# Patient Record
Sex: MALE | Race: WHITE | NOT HISPANIC OR LATINO | URBAN - METROPOLITAN AREA
[De-identification: names, ages, dates, MRNs, and addresses within clinical notes are randomized per-mention and may not be internally consistent; named-entity substitution may affect disease eponyms.]

---

## 2020-01-04 ENCOUNTER — EMERGENCY (EMERGENCY)
Facility: HOSPITAL | Age: 25
LOS: 1 days | Discharge: LEFT BEFORE DISCHARGE | End: 2020-01-04
Admitting: EMERGENCY MEDICINE

## 2020-01-04 VITALS
RESPIRATION RATE: 16 BRPM | HEIGHT: 65 IN | DIASTOLIC BLOOD PRESSURE: 78 MMHG | WEIGHT: 154.98 LBS | SYSTOLIC BLOOD PRESSURE: 122 MMHG | OXYGEN SATURATION: 99 % | HEART RATE: 59 BPM | TEMPERATURE: 98 F

## 2020-01-04 NOTE — ED PROVIDER NOTE - PROGRESS NOTE DETAILS
pt not in [place] when md came to evaluate patient, per nurses had gone outside, pt returned by when md returned for evaluation pt left without being seen

## 2021-05-26 ENCOUNTER — EMERGENCY (EMERGENCY)
Facility: HOSPITAL | Age: 26
LOS: 1 days | Discharge: HOME | End: 2021-05-26
Attending: EMERGENCY MEDICINE | Admitting: EMERGENCY MEDICINE
Payer: SELF-PAY

## 2021-05-26 VITALS
DIASTOLIC BLOOD PRESSURE: 64 MMHG | RESPIRATION RATE: 16 BRPM | SYSTOLIC BLOOD PRESSURE: 104 MMHG | OXYGEN SATURATION: 98 % | TEMPERATURE: 98 F | HEART RATE: 72 BPM

## 2021-05-26 VITALS
WEIGHT: 160.06 LBS | TEMPERATURE: 99 F | HEART RATE: 78 BPM | HEIGHT: 65 IN | DIASTOLIC BLOOD PRESSURE: 66 MMHG | OXYGEN SATURATION: 97 % | SYSTOLIC BLOOD PRESSURE: 103 MMHG | RESPIRATION RATE: 15 BRPM

## 2021-05-26 PROCEDURE — 73610 X-RAY EXAM OF ANKLE: CPT

## 2021-05-26 PROCEDURE — 99283 EMERGENCY DEPT VISIT LOW MDM: CPT | Mod: 25

## 2021-05-26 PROCEDURE — 99283 EMERGENCY DEPT VISIT LOW MDM: CPT

## 2021-05-26 PROCEDURE — 73610 X-RAY EXAM OF ANKLE: CPT | Mod: 26,LT

## 2021-05-26 NOTE — ED PROVIDER NOTE - CLINICAL SUMMARY MEDICAL DECISION MAKING FREE TEXT BOX
left ankle injury, twisted at work, fall , patient denies any other injuries, refused pain medication, will obtain xray, r/o fx , follow up with orthopedic

## 2021-05-26 NOTE — ED PROVIDER NOTE - NSFOLLOWUPINSTRUCTIONS_ED_ALL_ED_FT
follow up with ortho Dr Johnson, call tomorrow to arrange follow up  any worsening symptoms return to ED  recommend maintain use of splint and nonweight bearing with use of crutches  till you see orthopedic

## 2021-05-26 NOTE — ED PROVIDER NOTE - PATIENT PORTAL LINK FT
You can access the FollowMyHealth Patient Portal offered by [facility] by registering at the following website: [email or web address]. By joining [employer]’s FollowMyHealth portal, you will also be able to view your health information using other applications (apps) compatible with our system.

## 2021-05-26 NOTE — ED PROVIDER NOTE - OBJECTIVE STATEMENT
25 y male presents with left ankle injury, states twisted left ankle and fell today at work, denies any other injuries, no head injury , no loc.  refused pain medication.  denies hx of previous injury to left ankle.  + smoker, no PMD, no ortho

## 2021-05-26 NOTE — ED PROVIDER NOTE - CARE PROVIDER_API CALL
Gavino Johnson  ORTHOPAEDIC SURGERY  [address] [place] [address]  Phone: [phone]  Fax: [phone]  Follow Up Time: 1-3 Days

## 2021-05-26 NOTE — ED PROVIDER NOTE - ATTENDING CONTRIBUTION TO CARE
24 y/o M with c/o left ankle pain after trip and fall today.  denies other trauma    PE: well appearing no distress  left ankle swelling lateral mall.  +2 pulses    XRay, pain control

## 2021-05-26 NOTE — ED PROVIDER NOTE - PROGRESS NOTE DETAILS
xray result discussed, no acute finding, splint applied, advised follow up with orthopedic Dr Johnson, call tomorrow to arrange follow up , any worsening symptoms  or concerns return to ED xray result discussed, no acute finding, cannot exclude occult fx, splint applied, advised follow up with orthopedic Dr Johnson, call tomorrow to arrange follow up , any worsening symptoms  or concerns return to ED, nonweight bearing with crutches till seen by orthopedic

## 2021-05-27 ENCOUNTER — NON-APPOINTMENT (OUTPATIENT)
Age: 26
End: 2021-05-27

## 2021-05-27 ENCOUNTER — APPOINTMENT (OUTPATIENT)
Dept: ORTHOPEDIC SURGERY | Facility: CLINIC | Age: 26
End: 2021-05-27
Payer: OTHER MISCELLANEOUS

## 2021-05-27 VITALS
HEIGHT: 65 IN | HEART RATE: 79 BPM | WEIGHT: 160 LBS | SYSTOLIC BLOOD PRESSURE: 104 MMHG | BODY MASS INDEX: 26.66 KG/M2 | DIASTOLIC BLOOD PRESSURE: 68 MMHG

## 2021-05-27 DIAGNOSIS — M25.572 PAIN IN LEFT ANKLE AND JOINTS OF LEFT FOOT: ICD-10-CM

## 2021-05-27 PROCEDURE — 99203 OFFICE O/P NEW LOW 30 MIN: CPT

## 2021-05-27 PROCEDURE — 73610 X-RAY EXAM OF ANKLE: CPT | Mod: LT

## 2021-05-27 PROCEDURE — 99072 ADDL SUPL MATRL&STAF TM PHE: CPT

## 2021-05-27 RX ORDER — MELOXICAM 15 MG/1
15 TABLET ORAL DAILY
Qty: 30 | Refills: 0 | Status: ACTIVE | COMMUNITY
Start: 2021-05-27 | End: 1900-01-01

## 2021-05-31 PROBLEM — M25.572 ACUTE LEFT ANKLE PAIN: Status: ACTIVE | Noted: 2021-05-27

## 2021-05-31 NOTE — PHYSICAL EXAM
[de-identified] : Extremity: +PPAV (supple) B feet, able to perform B SLHR testing, soft tissue swelling lateral L ankle, residual tenderness anterolateral aspect L ankle / ATFL.  Nontender L distal fibula, peroneals, syndesmosis, Achilles, medial malleolus, Deltoid, hindfoot ST, midfoot LF and PTT insertional, and forefoot / base 5th metatarsal.  Stable Drawer testing L ankle, 5- / 5 evertor strength L ankle, calves soft and nontender, sensorimotor unchanged, skin intact B LE.  AOx3, mood / affect normal. [de-identified] : EXAM: XR ANKLE COMP MIN 3 VIEWS LT\par \par PROCEDURE DATE: 05/26/2021\par \par INTERPRETATION: DATE OF STUDY: 5/26/21\par \par COMPARISON: None.\par \par CLINICAL HISTORY: Status post left ankle injury\par \par FINDINGS:\par 3 views of the left ankle are submitted. The osseous and articular structures are intact without evidence of fracture, dislocation, arthritis or joint effusion.\par The ankle mortise and talar dome are maintained.\par \par IMPRESSION: No fracture-subluxation demonstrated.\par \par MELECIO MELGAR MD; Attending Radiologist\par This document has been electronically signed. May 26 2021 3:52PM\par Radiographs (3v L ankle) reveal soft tissue swelling lateral L ankle.

## 2021-05-31 NOTE — DISCUSSION/SUMMARY
[de-identified] : Discussed with patient nature of condition, potential course / sequelae, options reviewed.  Cam boot immobilization at all times and Cam boot ambulation, activity modification, physical therapy / rehabilitation and associated modalities, HEP.  Return to office in 4 - 6 weeks / PRN, all questions answered.

## 2021-05-31 NOTE — HISTORY OF PRESENT ILLNESS
[FreeTextEntry1] : Mr. ESTEFANIA HERNANDEZ  is a 25 year old male who presents with left ankle pain since yesterday when he slipped and twisted his ankle on a step while at work.  He went to [facility] and was placed in an ankle splint.  He has lateral ankle pain and has not placed any weight on that foot. \par \par

## 2021-06-10 ENCOUNTER — APPOINTMENT (OUTPATIENT)
Dept: ORTHOPEDIC SURGERY | Facility: CLINIC | Age: 26
End: 2021-06-10

## 2021-06-16 ENCOUNTER — APPOINTMENT (OUTPATIENT)
Dept: ORTHOPEDIC SURGERY | Facility: CLINIC | Age: 26
End: 2021-06-16
Payer: OTHER MISCELLANEOUS

## 2021-06-16 DIAGNOSIS — S96.912D STRAIN OF UNSPECIFIED MUSCLE AND TENDON AT ANKLE AND FOOT LEVEL, LEFT FOOT, SUBSEQUENT ENCOUNTER: ICD-10-CM

## 2021-06-16 DIAGNOSIS — M21.42 FLAT FOOT [PES PLANUS] (ACQUIRED), RIGHT FOOT: ICD-10-CM

## 2021-06-16 DIAGNOSIS — M21.41 FLAT FOOT [PES PLANUS] (ACQUIRED), RIGHT FOOT: ICD-10-CM

## 2021-06-16 PROCEDURE — 99213 OFFICE O/P EST LOW 20 MIN: CPT

## 2021-06-16 PROCEDURE — 99072 ADDL SUPL MATRL&STAF TM PHE: CPT

## 2021-06-17 PROBLEM — M21.41 ACQUIRED PES PLANUS OF BOTH FEET: Status: ACTIVE | Noted: 2021-05-31

## 2021-06-17 PROBLEM — S96.912D STRAIN OF LEFT ANKLE, SUBSEQUENT ENCOUNTER: Status: ACTIVE | Noted: 2021-05-31

## 2021-06-17 NOTE — PHYSICAL EXAM
[Normal] : Oriented to person, place, and time, insight and judgement were intact and the affect was normal [de-identified] : Extremity: +PPAV (supple) B feet, able to perform B SLHR testing, resolving soft tissue swelling lateral L ankle, nontender anterolateral aspect L ankle / ATFL. Nontender L distal fibula, peroneals, syndesmosis, Achilles, medial malleolus, Deltoid, hindfoot ST, midfoot LF and PTT insertional, and forefoot / base 5th metatarsal. Stable Drawer testing L ankle, 5 / 5 evertor strength L ankle, good range of motion / painless L ankle and ST joint left hindfoot, calves soft and nontender, sensorimotor unchanged, skin intact B LE. AOx3, mood / affect normal. \par  [de-identified] : NATALEE, NAD

## 2021-06-17 NOTE — DISCUSSION/SUMMARY
[de-identified] : Options reviewed, NB shoe wear, activity modification and progression protocol, ankle rehabilitation and HEP.  Return to office in 2 - 3 months / PRN, all questions answered.

## 2021-06-17 NOTE — HISTORY OF PRESENT ILLNESS
[FreeTextEntry1] : Patient is here for left ankle pain follow up. He has noticed great improvement in his condition since last evaluation. He has done nothing to treat it. He states that he feels ready to return to work at this time. There has been no recent injury.

## 2021-06-17 NOTE — RETURN TO WORK/SCHOOL
[FreeTextEntry1] :  He is cleared to return to full work duties without restrictions as of 6/17/21. Should you have any questions, please contact the office at [phone].

## 2021-07-14 NOTE — ED PROVIDER NOTE - FAMILY HISTORY
Referred by: Cathy Mathew PA-C; Medical Diagnosis (from order):    Diagnosis Information      Diagnosis    719.41, 338.29 (ICD-9-CM) - M25.511, G89.29, M25.512 (ICD-10-CM) - Chronic pain of both shoulders    719.42 (ICD-9-CM) - M25.521 (ICD-10-CM) - Right elbow pain                Progress Note    Visit:  6     SUBJECTIVE                                                                                                             \"I think therapy is working but now I have this pain on the side of my hand after I fell.\"  Pain / Symptoms:  Pain rating (out of 10): Current: 2 ; Worst: 3Location: Right ulnar sided palm    1/10 right lateral epicondyle     OBJECTIVE                                                                                                                     Range of Motion (ROM)   (degrees unless noted; active unless noted; norms in ( ); negative=lacking to 0, positive=beyond 0)   Wrist:    - Flexion (60-80):        • Right:  70    - Extension (60-70):        • Right: 65    - Radial Deviation (20):        • Right: 20    - Ulnar Deviation (30):        • Right: 30 pain    Strength  (out of 5 unless noted, standard test position unless noted, lbs tested with hand held dynamometer)   : (lbs)    - Neutral:        • Right: Trial(s): 79.3, 63.3, 72.7, Average: 71.77    - Extended Pronation:         • Right: Trial(s): 63.9, 70.3, 64.97, Average: 60.7     norms: 55-59 years, male: left 59.8-106.6, right 74.4-127.8; female: left 35.4-59.2, right 44.8-69.8        Outcome Measures:   Quick Disabilities of the Arm, Shoulder and Hand: QuickDash Total Score: 20.45  (scored 0-100; a higher score indicates greater disability) see flowsheet for additional documentation    TREATMENT                                                                                                                  Therapeutic Exercise:  See HEP    Charly twist with red flexbar x15 reps   Thenar stretch with yellow theraputty  x15 reps    Manual Therapy:  Soft tissue mobilization to patient's wrist flexors, extensors and carpal tunnel, Hawkgrips used.  P/ROM to wrist flexors and extensors x15 reps.      Home Exercise Program: Passive wrist flexor stretch 5 x daily - 7 x weekly - 1 sets - 10 reps  Passive wrist extensor stretch 5 x daily - 7 x weekly - 1 sets - 10 reps  Median nerve glides on wall 5 x daily - 7 x weekly - 1 sets - 10 reps  Wrist extensor massage with tennis ball 5 x daily - 7 x weekly - 1 sets - 10 reps    Ice massage 3-5 minutes and/or ice pack 10 minutes 1-2x/day    Issued via [employer]:  [email or web address]    Ultrasound (22245)  Location: left lateral epicondyle and ulnar sided wrist  Duty Cycle: 100%  Frequency: 1 Mhz  Intensity (w/cm2): 1.4  Duration: 5 minutes each    ASSESSMENT                                                                                                             Patient has met 1 of 3 goals set at his initial evaluation. He presents to therapy with report of minimal pain at the lateral epicondyle, however pain is now reported at the ulnar sided wrist after a fall. Encouraged patient to follow up with MD regarding pain. He did report relief with KT, however states that it does not last throughout the day for him. OT discussed possible benefits of wrist widget. Numbness and tingling at the median nerve distribution intermittently occurs at night. No pain had been expressed regarding bilateral shoulder. He would benefit from continued occupational therapy to progress toward remaining goals and educate on proper orthotic management for ulnar sided wrist pain.    To date the patient has made gains as expected as reported. Patient continues to have impairments and functional deficits as noted.  Patient will continue to benefit from skilled care as outlined.  Patient Education:   Results  of above outlined education: Verbalizes understanding      PLAN                                                                                                                           Updates to plan of care: extend current plan of care    Frequency / Duration: 1 times per week tapering as patient progresses  for an estimated additional 4 visits for additional 6 weeks    Suggestions for next session as indicated: Continue per POC for 4 additional OT sessions.       GOALS                                                                                                                           Long Term Goals: to be met by end of plan of care  1. Patient will increase right hand  strength with elbow extended and forearm pronated to >80 lb. to better reach to open a heavy door.  Status: progressing/ongoing  2. Patient will increase right wrist flexion to 70 degrees to better wash his upper back.  Status: met   3. Patient will report <2/10 pain with activities.  Status: progressing/ongoing      Therapy procedure time and total treatment time can be found documented on the Time Entry flowsheet   No pertinent family history in first degree relatives

## 2023-01-28 ENCOUNTER — NON-APPOINTMENT (OUTPATIENT)
Age: 28
End: 2023-01-28

## 2024-07-02 ENCOUNTER — EMERGENCY (EMERGENCY)
Facility: HOSPITAL | Age: 29
LOS: 1 days | Discharge: HOME | End: 2024-07-02
Attending: STUDENT IN AN ORGANIZED HEALTH CARE EDUCATION/TRAINING PROGRAM | Admitting: STUDENT IN AN ORGANIZED HEALTH CARE EDUCATION/TRAINING PROGRAM
Payer: COMMERCIAL

## 2024-07-02 VITALS
WEIGHT: 190.7 LBS | SYSTOLIC BLOOD PRESSURE: 136 MMHG | HEIGHT: 65 IN | RESPIRATION RATE: 18 BRPM | TEMPERATURE: 98 F | HEART RATE: 55 BPM | DIASTOLIC BLOOD PRESSURE: 72 MMHG | OXYGEN SATURATION: 98 %

## 2024-07-02 VITALS
SYSTOLIC BLOOD PRESSURE: 130 MMHG | OXYGEN SATURATION: 100 % | DIASTOLIC BLOOD PRESSURE: 76 MMHG | HEART RATE: 60 BPM | RESPIRATION RATE: 18 BRPM | TEMPERATURE: 98 F

## 2024-07-02 LAB
ALBUMIN SERPL ELPH-MCNC: 4.2 G/DL — SIGNIFICANT CHANGE UP (ref 3.3–5)
ALP SERPL-CCNC: 52 U/L — SIGNIFICANT CHANGE UP (ref 40–120)
ALT FLD-CCNC: 38 U/L — SIGNIFICANT CHANGE UP (ref 12–78)
ANION GAP SERPL CALC-SCNC: 5 MMOL/L — SIGNIFICANT CHANGE UP (ref 5–17)
APPEARANCE UR: ABNORMAL
AST SERPL-CCNC: 25 U/L — SIGNIFICANT CHANGE UP (ref 15–37)
BILIRUB SERPL-MCNC: 0.9 MG/DL — SIGNIFICANT CHANGE UP (ref 0.2–1.2)
BILIRUB UR-MCNC: ABNORMAL
BUN SERPL-MCNC: 11 MG/DL — SIGNIFICANT CHANGE UP (ref 7–23)
CALCIUM SERPL-MCNC: 9.6 MG/DL — SIGNIFICANT CHANGE UP (ref 8.5–10.1)
CHLORIDE SERPL-SCNC: 107 MMOL/L — SIGNIFICANT CHANGE UP (ref 96–108)
CO2 SERPL-SCNC: 25 MMOL/L — SIGNIFICANT CHANGE UP (ref 22–31)
COLOR SPEC: ABNORMAL
CREAT SERPL-MCNC: 1.3 MG/DL — SIGNIFICANT CHANGE UP (ref 0.5–1.3)
DIFF PNL FLD: ABNORMAL
EGFR: 76 ML/MIN/1.73M2 — SIGNIFICANT CHANGE UP
GLUCOSE SERPL-MCNC: 116 MG/DL — HIGH (ref 70–99)
GLUCOSE UR QL: NEGATIVE MG/DL — SIGNIFICANT CHANGE UP
HCT VFR BLD CALC: 43.3 % — SIGNIFICANT CHANGE UP (ref 39–50)
HGB BLD-MCNC: 14.8 G/DL — SIGNIFICANT CHANGE UP (ref 13–17)
KETONES UR-MCNC: ABNORMAL MG/DL
LEUKOCYTE ESTERASE UR-ACNC: ABNORMAL
MCHC RBC-ENTMCNC: 29.5 PG — SIGNIFICANT CHANGE UP (ref 27–34)
MCHC RBC-ENTMCNC: 34.2 GM/DL — SIGNIFICANT CHANGE UP (ref 32–36)
MCV RBC AUTO: 86.4 FL — SIGNIFICANT CHANGE UP (ref 80–100)
NITRITE UR-MCNC: NEGATIVE — SIGNIFICANT CHANGE UP
NRBC # BLD: 0 /100 WBCS — SIGNIFICANT CHANGE UP (ref 0–0)
PH UR: 5.5 — SIGNIFICANT CHANGE UP (ref 5–8)
PLATELET # BLD AUTO: 289 K/UL — SIGNIFICANT CHANGE UP (ref 150–400)
POTASSIUM SERPL-MCNC: 4.3 MMOL/L — SIGNIFICANT CHANGE UP (ref 3.5–5.3)
POTASSIUM SERPL-SCNC: 4.3 MMOL/L — SIGNIFICANT CHANGE UP (ref 3.5–5.3)
PROT SERPL-MCNC: 7.7 G/DL — SIGNIFICANT CHANGE UP (ref 6–8.3)
PROT UR-MCNC: 300 MG/DL
RBC # BLD: 5.01 M/UL — SIGNIFICANT CHANGE UP (ref 4.2–5.8)
RBC # FLD: 12.5 % — SIGNIFICANT CHANGE UP (ref 10.3–14.5)
SODIUM SERPL-SCNC: 137 MMOL/L — SIGNIFICANT CHANGE UP (ref 135–145)
SP GR SPEC: 1.03 — HIGH (ref 1–1.03)
UROBILINOGEN FLD QL: 1 MG/DL — SIGNIFICANT CHANGE UP (ref 0.2–1)
WBC # BLD: 9 K/UL — SIGNIFICANT CHANGE UP (ref 3.8–10.5)
WBC # FLD AUTO: 9 K/UL — SIGNIFICANT CHANGE UP (ref 3.8–10.5)

## 2024-07-02 PROCEDURE — 96374 THER/PROPH/DIAG INJ IV PUSH: CPT

## 2024-07-02 PROCEDURE — 85027 COMPLETE CBC AUTOMATED: CPT

## 2024-07-02 PROCEDURE — 80053 COMPREHEN METABOLIC PANEL: CPT

## 2024-07-02 PROCEDURE — 96375 TX/PRO/DX INJ NEW DRUG ADDON: CPT

## 2024-07-02 PROCEDURE — 74176 CT ABD & PELVIS W/O CONTRAST: CPT | Mod: MC

## 2024-07-02 PROCEDURE — 74176 CT ABD & PELVIS W/O CONTRAST: CPT | Mod: 26,MC

## 2024-07-02 PROCEDURE — 99284 EMERGENCY DEPT VISIT MOD MDM: CPT | Mod: 25

## 2024-07-02 PROCEDURE — 36415 COLL VENOUS BLD VENIPUNCTURE: CPT

## 2024-07-02 PROCEDURE — 99285 EMERGENCY DEPT VISIT HI MDM: CPT

## 2024-07-02 PROCEDURE — 81001 URINALYSIS AUTO W/SCOPE: CPT

## 2024-07-02 PROCEDURE — 87086 URINE CULTURE/COLONY COUNT: CPT

## 2024-07-02 RX ORDER — KETOROLAC TROMETHAMINE 30 MG/ML
15 INJECTION, SOLUTION INTRAMUSCULAR ONCE
Refills: 0 | Status: DISCONTINUED | OUTPATIENT
Start: 2024-07-02 | End: 2024-07-02

## 2024-07-02 RX ORDER — ONDANSETRON HYDROCHLORIDE 2 MG/ML
1 INJECTION INTRAMUSCULAR; INTRAVENOUS
Qty: 10 | Refills: 0
Start: 2024-07-02 | End: 2024-07-08

## 2024-07-02 RX ORDER — MORPHINE SULFATE 100 MG/1
4 TABLET, EXTENDED RELEASE ORAL ONCE
Refills: 0 | Status: DISCONTINUED | OUTPATIENT
Start: 2024-07-02 | End: 2024-07-02

## 2024-07-02 RX ORDER — SODIUM CHLORIDE 0.9 % (FLUSH) 0.9 %
1000 SYRINGE (ML) INJECTION ONCE
Refills: 0 | Status: COMPLETED | OUTPATIENT
Start: 2024-07-02 | End: 2024-07-02

## 2024-07-02 RX ORDER — TAMSULOSIN HYDROCHLORIDE 0.4 MG/1
1 CAPSULE ORAL
Qty: 28 | Refills: 0
Start: 2024-07-02 | End: 2024-07-29

## 2024-07-02 RX ORDER — ONDANSETRON HYDROCHLORIDE 2 MG/ML
4 INJECTION INTRAMUSCULAR; INTRAVENOUS ONCE
Refills: 0 | Status: COMPLETED | OUTPATIENT
Start: 2024-07-02 | End: 2024-07-02

## 2024-07-02 RX ADMIN — Medication 1000 MILLILITER(S): at 10:58

## 2024-07-02 RX ADMIN — KETOROLAC TROMETHAMINE 15 MILLIGRAM(S): 30 INJECTION, SOLUTION INTRAMUSCULAR at 10:58

## 2024-07-02 RX ADMIN — KETOROLAC TROMETHAMINE 15 MILLIGRAM(S): 30 INJECTION, SOLUTION INTRAMUSCULAR at 11:58

## 2024-07-02 RX ADMIN — MORPHINE SULFATE 4 MILLIGRAM(S): 100 TABLET, EXTENDED RELEASE ORAL at 11:58

## 2024-07-02 RX ADMIN — ONDANSETRON HYDROCHLORIDE 4 MILLIGRAM(S): 2 INJECTION INTRAMUSCULAR; INTRAVENOUS at 10:58

## 2024-07-02 RX ADMIN — MORPHINE SULFATE 4 MILLIGRAM(S): 100 TABLET, EXTENDED RELEASE ORAL at 10:58

## 2024-07-08 ENCOUNTER — APPOINTMENT (OUTPATIENT)
Dept: UROLOGY | Facility: CLINIC | Age: 29
End: 2024-07-08
Payer: COMMERCIAL

## 2024-07-08 VITALS
HEART RATE: 65 BPM | OXYGEN SATURATION: 99 % | HEIGHT: 65 IN | SYSTOLIC BLOOD PRESSURE: 126 MMHG | DIASTOLIC BLOOD PRESSURE: 85 MMHG | WEIGHT: 180 LBS | BODY MASS INDEX: 29.99 KG/M2

## 2024-07-08 DIAGNOSIS — N20.0 CALCULUS OF KIDNEY: ICD-10-CM

## 2024-07-08 PROCEDURE — 99204 OFFICE O/P NEW MOD 45 MIN: CPT
